# Patient Record
Sex: MALE | HISPANIC OR LATINO | Employment: FULL TIME | URBAN - METROPOLITAN AREA
[De-identification: names, ages, dates, MRNs, and addresses within clinical notes are randomized per-mention and may not be internally consistent; named-entity substitution may affect disease eponyms.]

---

## 2023-03-08 ENCOUNTER — OFFICE VISIT (OUTPATIENT)
Dept: URGENT CARE | Facility: CLINIC | Age: 38
End: 2023-03-08
Payer: COMMERCIAL

## 2023-03-08 VITALS
WEIGHT: 179 LBS | HEIGHT: 64 IN | SYSTOLIC BLOOD PRESSURE: 150 MMHG | HEART RATE: 61 BPM | TEMPERATURE: 98 F | OXYGEN SATURATION: 98 % | BODY MASS INDEX: 30.56 KG/M2 | RESPIRATION RATE: 19 BRPM | DIASTOLIC BLOOD PRESSURE: 96 MMHG

## 2023-03-08 DIAGNOSIS — G56.21 CUBITAL TUNNEL SYNDROME ON RIGHT: ICD-10-CM

## 2023-03-08 DIAGNOSIS — M25.511 ACUTE PAIN OF RIGHT SHOULDER: Primary | ICD-10-CM

## 2023-03-08 DIAGNOSIS — S46.911A RIGHT SHOULDER STRAIN, INITIAL ENCOUNTER: ICD-10-CM

## 2023-03-08 PROCEDURE — 99204 PR OFFICE/OUTPT VISIT, NEW, LEVL IV, 45-59 MIN: ICD-10-PCS | Mod: 25,S$GLB,, | Performed by: STUDENT IN AN ORGANIZED HEALTH CARE EDUCATION/TRAINING PROGRAM

## 2023-03-08 PROCEDURE — 96372 PR INJECTION,THERAP/PROPH/DIAG2ST, IM OR SUBCUT: ICD-10-PCS | Mod: S$GLB,,, | Performed by: EMERGENCY MEDICINE

## 2023-03-08 PROCEDURE — 96372 THER/PROPH/DIAG INJ SC/IM: CPT | Mod: S$GLB,,, | Performed by: EMERGENCY MEDICINE

## 2023-03-08 PROCEDURE — 99204 OFFICE O/P NEW MOD 45 MIN: CPT | Mod: 25,S$GLB,, | Performed by: STUDENT IN AN ORGANIZED HEALTH CARE EDUCATION/TRAINING PROGRAM

## 2023-03-08 RX ORDER — CYCLOBENZAPRINE HCL 5 MG
5 TABLET ORAL 3 TIMES DAILY PRN
Qty: 15 TABLET | Refills: 0 | Status: SHIPPED | OUTPATIENT
Start: 2023-03-08 | End: 2023-03-13

## 2023-03-08 RX ORDER — DEXAMETHASONE SODIUM PHOSPHATE 4 MG/ML
8 INJECTION, SOLUTION INTRA-ARTICULAR; INTRALESIONAL; INTRAMUSCULAR; INTRAVENOUS; SOFT TISSUE
Status: COMPLETED | OUTPATIENT
Start: 2023-03-08 | End: 2023-03-08

## 2023-03-08 RX ORDER — NAPROXEN 500 MG/1
500 TABLET ORAL 2 TIMES DAILY
Qty: 30 TABLET | Refills: 0 | Status: SHIPPED | OUTPATIENT
Start: 2023-03-09 | End: 2023-03-24

## 2023-03-08 RX ORDER — KETOROLAC TROMETHAMINE 30 MG/ML
60 INJECTION, SOLUTION INTRAMUSCULAR; INTRAVENOUS
Status: COMPLETED | OUTPATIENT
Start: 2023-03-08 | End: 2023-03-08

## 2023-03-08 RX ORDER — METHYLPREDNISOLONE 4 MG/1
TABLET ORAL
Qty: 21 EACH | Refills: 0 | Status: SHIPPED | OUTPATIENT
Start: 2023-03-09 | End: 2023-03-29

## 2023-03-08 RX ADMIN — DEXAMETHASONE SODIUM PHOSPHATE 8 MG: 4 INJECTION, SOLUTION INTRA-ARTICULAR; INTRALESIONAL; INTRAMUSCULAR; INTRAVENOUS; SOFT TISSUE at 09:03

## 2023-03-08 RX ADMIN — KETOROLAC TROMETHAMINE 60 MG: 30 INJECTION, SOLUTION INTRAMUSCULAR; INTRAVENOUS at 09:03

## 2023-03-08 NOTE — PROGRESS NOTES
"Subjective:       Patient ID: Rogelio Watts is a 37 y.o. male.    Vitals:  height is 5' 4" (1.626 m) and weight is 81.2 kg (179 lb). His oral temperature is 98.4 °F (36.9 °C). His blood pressure is 150/96 (abnormal) and his pulse is 61. His respiration is 19 and oxygen saturation is 98%.     Chief Complaint: Shoulder Pain    Patient is a 37-year-old male brought to clinic via employer for evaluation of right shoulder pain.  Patient reports no injury or trauma.  Patient reports repetitive movements and work.  Patient reports increased pain in the right shoulder.  Patient states this is causing decreased range of motion of the shoulder.  Patient states pain at current is a 10 on 10 scale in as worst a 10 on 10 scale.  Patient states no over-the-counter medications for symptoms at this point.  Patient states he has not noticed any swelling or skin discoloration to the shoulder.  Patient states pain radiates down into the elbow and sometimes down into his 3rd through 5th fingers.  Patient states that he also intermittently experiences pain whenever he tries to lift something with the right hand which pain then radiates back up into the shoulder.  Patient describes pain to be sharp and stabbing in nature at that time.  Patient states otherwise pain is a constant dull or aching sensation.  Employer states patient has experienced similar episodes approximally 2 years ago where he had to be placed on light duty and provided with anti-inflammatories.  Patient unsure if he had imaging at that visit.    Shoulder Pain   The pain is present in the right shoulder. This is a recurrent problem. The current episode started yesterday. There has been no history of extremity trauma. The problem occurs constantly. The problem has been gradually worsening. The pain is at a severity of 10/10. The pain is severe. Associated symptoms include a limited range of motion and stiffness. Pertinent negatives include no fever, headaches or numbness. "     Constitution: Negative. Negative for chills, sweating, fatigue and fever.   HENT: Negative.     Neck: neck negative. Negative for neck pain, neck stiffness and neck swelling.   Cardiovascular: Negative.  Negative for chest pain and palpitations.   Eyes: Negative.    Respiratory: Negative.  Negative for chest tightness, cough and shortness of breath.    Gastrointestinal: Negative.  Negative for abdominal pain, nausea, vomiting and diarrhea.   Endocrine: negative.   Genitourinary: Negative.    Musculoskeletal:  Positive for joint pain (Right shoulder) and abnormal ROM of joint. Negative for joint swelling.   Skin: Negative.  Negative for color change, pale, rash and erythema.   Allergic/Immunologic: Negative.    Neurological: Negative.  Negative for dizziness, headaches, disorientation, altered mental status, numbness and tingling.   Hematologic/Lymphatic: Negative.    Psychiatric/Behavioral: Negative.  Negative for altered mental status, disorientation and confusion.      Objective:      Physical Exam   Constitutional: He is oriented to person, place, and time. He appears well-developed. He is cooperative.  Non-toxic appearance. He does not appear ill. No distress.   HENT:   Head: Normocephalic and atraumatic.   Ears:   Right Ear: Hearing, tympanic membrane, external ear and ear canal normal.   Left Ear: Hearing, tympanic membrane, external ear and ear canal normal.   Nose: Nose normal. No mucosal edema or nasal deformity. No epistaxis. Right sinus exhibits no maxillary sinus tenderness and no frontal sinus tenderness. Left sinus exhibits no maxillary sinus tenderness and no frontal sinus tenderness.   Mouth/Throat: Uvula is midline, oropharynx is clear and moist and mucous membranes are normal. No trismus in the jaw. Normal dentition. No uvula swelling.   Eyes: Conjunctivae and lids are normal. Pupils are equal, round, and reactive to light. Right eye exhibits no discharge. Left eye exhibits no discharge. No  scleral icterus.   Neck: Trachea normal and phonation normal. Neck supple. No neck rigidity present.   Cardiovascular: Normal rate, regular rhythm, normal heart sounds and normal pulses.   Pulmonary/Chest: Effort normal and breath sounds normal. No respiratory distress. He has no wheezes. He has no rhonchi. He has no rales.   Abdominal: Normal appearance and bowel sounds are normal. He exhibits no distension. Soft. There is no abdominal tenderness.   Musculoskeletal:         General: No signs of injury.      Right shoulder: He exhibits decreased range of motion, tenderness and bony tenderness. He exhibits no swelling and no crepitus.      Right elbow: He exhibits decreased range of motion. He exhibits no swelling. Tenderness (Increased tenderness at cubital tunnel) found.      Cervical back: He exhibits no tenderness.   Neurological: He is alert and oriented to person, place, and time. He exhibits normal muscle tone.   Skin: Skin is warm, dry, intact, not diaphoretic, not pale and no rash. No bruising and No erythema   Psychiatric: His speech is normal and behavior is normal. Judgment and thought content normal.   Nursing note and vitals reviewed.      Assessment:       1. Acute pain of right shoulder    2. Right shoulder strain, initial encounter    3. Cubital tunnel syndrome on right          Plan:         Acute pain of right shoulder  -     XR SHOULDER TRAUMA 3 VIEW RIGHT; Future; Expected date: 03/08/2023    Right shoulder strain, initial encounter    Cubital tunnel syndrome on right    Other orders  -     ketorolac injection 60 mg  -     dexAMETHasone injection 8 mg  -     methylPREDNISolone (MEDROL DOSEPACK) 4 mg tablet; use as directed  Dispense: 21 each; Refill: 0  -     naproxen (NAPROSYN) 500 MG tablet; Take 1 tablet (500 mg total) by mouth 2 (two) times daily. for 15 days  Dispense: 30 tablet; Refill: 0  -     cyclobenzaprine (FLEXERIL) 5 MG tablet; Take 1 tablet (5 mg total) by mouth 3 (three) times  daily as needed for Muscle spasms.  Dispense: 15 tablet; Refill: 0                 X-ray right shoulder: No fracture or dislocation.  No significant degenerative change.  Mild soft tissue swelling is present over the shoulder.  Toradol 60 milligrams IM and Decadron 8 milligrams IM in clinic.  Patient tolerated well.  No complications noted.  Take medications as prescribed.  Use of no other NSAIDs while on naproxen; may rotate with Tylenol.    Instructions provided on muscle relaxers to include no driving or operating heavy machinery.    Patient placed in right shoulder sling in clinic.  Patient tolerated well.  No complications noted.  Positive pulse motor and sensory noted pre and post placement.  Recommend rotating ice and warm moist heat as directed.    Recommend light duty for 1 week then follow-up for further evaluation and clearance.    Follow-up with Orthopedics without resolution of symptoms.    Return to clinic as needed.    Follow-up PCP as needed.    To ED for any new acutely worsening symptoms.    Patient in agreement with plan of care.    DISCLAIMER: Please note that my documentation in this Electronic Healthcare Record was produced using speech recognition software and therefore may contain errors related to that software system.These could include grammar, punctuation and spelling errors or the inclusion/exclusion of phrases that were not intended. Garbled syntax, mangled pronouns, and other bizarre constructions may be attributed to that software system.

## 2023-03-20 ENCOUNTER — OFFICE VISIT (OUTPATIENT)
Dept: URGENT CARE | Facility: CLINIC | Age: 38
End: 2023-03-20
Payer: COMMERCIAL

## 2023-03-20 VITALS
HEART RATE: 75 BPM | SYSTOLIC BLOOD PRESSURE: 143 MMHG | TEMPERATURE: 98 F | RESPIRATION RATE: 16 BRPM | BODY MASS INDEX: 30.73 KG/M2 | DIASTOLIC BLOOD PRESSURE: 90 MMHG | OXYGEN SATURATION: 97 % | WEIGHT: 179 LBS

## 2023-03-20 DIAGNOSIS — S49.91XD RIGHT SHOULDER INJURY, SUBSEQUENT ENCOUNTER: ICD-10-CM

## 2023-03-20 DIAGNOSIS — Z76.89 RETURN TO WORK EVALUATION: Primary | ICD-10-CM

## 2023-03-20 PROCEDURE — 99213 OFFICE O/P EST LOW 20 MIN: CPT | Mod: S$GLB,,, | Performed by: STUDENT IN AN ORGANIZED HEALTH CARE EDUCATION/TRAINING PROGRAM

## 2023-03-20 PROCEDURE — 99213 PR OFFICE/OUTPT VISIT, EST, LEVL III, 20-29 MIN: ICD-10-PCS | Mod: S$GLB,,, | Performed by: STUDENT IN AN ORGANIZED HEALTH CARE EDUCATION/TRAINING PROGRAM

## 2023-03-20 NOTE — PROGRESS NOTES
Subjective:       Patient ID: Rogelio Watts is a 37 y.o. male.    Vitals:  weight is 81.2 kg (179 lb). His temperature is 98.2 °F (36.8 °C). His blood pressure is 143/90 (abnormal) and his pulse is 75. His respiration is 16 and oxygen saturation is 97%.     Chief Complaint: Shoulder Injury    Patient is a 37-year-old male who presents to clinic for work clearance evaluation.  Patient previously had acute shoulder pain while at work on March 8, 2023.  Patient was evaluated in the clinic.  Patient had negative x-rays in clinic.  Patient was provided with Decadron and Toradol injections.  Patient was prescribed Medrol Dosepak, Flexeril, naproxen.  Patient was accompanied with light duty via his employer.  Patient initially experience right shoulder pain with decreased range of motion.  Patient's pain initially radiated down into his elbow and fingers.  Patient states that all his symptoms have currently resolved and he is feeling much better at this point.  Employer present with patient at visit states they gave him an extra week of light duty which seems to have helped well with his symptoms.    Shoulder Injury   The incident occurred at work. The incident occurred more than 1 week ago. Pertinent negatives include no chest pain or numbness.     Constitution: Negative. Negative for chills, sweating, fatigue and fever.   HENT: Negative.     Neck: neck negative. Negative for neck pain, neck stiffness and neck swelling.   Cardiovascular: Negative.  Negative for chest pain and palpitations.   Eyes: Negative.    Respiratory: Negative.  Negative for chest tightness, cough and shortness of breath.    Gastrointestinal: Negative.  Negative for abdominal pain, nausea, vomiting and diarrhea.   Endocrine: negative.   Genitourinary: Negative.    Musculoskeletal:  Negative for joint pain, joint swelling and abnormal ROM of joint.   Skin: Negative.  Negative for color change, pale, rash, wound and erythema.   Allergic/Immunologic:  Negative.    Neurological: Negative.  Negative for dizziness, headaches, disorientation, altered mental status, numbness and tingling.   Hematologic/Lymphatic: Negative.    Psychiatric/Behavioral: Negative.  Negative for altered mental status, disorientation and confusion.      Objective:      Physical Exam   Constitutional: He is oriented to person, place, and time. He appears well-developed. He is cooperative.  Non-toxic appearance. He does not appear ill. No distress.   HENT:   Head: Normocephalic and atraumatic.   Ears:   Right Ear: Hearing, tympanic membrane, external ear and ear canal normal.   Left Ear: Hearing, tympanic membrane, external ear and ear canal normal.   Nose: Nose normal. No mucosal edema or nasal deformity. No epistaxis. Right sinus exhibits no maxillary sinus tenderness and no frontal sinus tenderness. Left sinus exhibits no maxillary sinus tenderness and no frontal sinus tenderness.   Mouth/Throat: Uvula is midline, oropharynx is clear and moist and mucous membranes are normal. No trismus in the jaw. Normal dentition. No uvula swelling.   Eyes: Conjunctivae and lids are normal. Pupils are equal, round, and reactive to light. Right eye exhibits no discharge. Left eye exhibits no discharge. No scleral icterus.   Neck: Trachea normal and phonation normal. Neck supple. No neck rigidity present.   Cardiovascular: Normal rate, regular rhythm, normal heart sounds and normal pulses.   Pulmonary/Chest: Effort normal and breath sounds normal. No respiratory distress. He has no wheezes. He has no rhonchi. He has no rales.   Abdominal: Normal appearance and bowel sounds are normal. He exhibits no distension. Soft. There is no abdominal tenderness.   Musculoskeletal: Normal range of motion.         General: No swelling or tenderness. Normal range of motion.      Right shoulder: He exhibits normal range of motion, no tenderness, no bony tenderness, no swelling, no crepitus, no deformity and normal  strength.      Right elbow: He exhibits normal range of motion and no swelling. No tenderness found.      Right wrist: He exhibits normal range of motion, no tenderness, no bony tenderness, no swelling and no crepitus.      Cervical back: He exhibits no tenderness.      Right upper arm: He exhibits no tenderness, no bony tenderness, no swelling and no deformity.      Right forearm: He exhibits no tenderness, no bony tenderness, no swelling and no deformity.   Neurological: He is alert and oriented to person, place, and time. He exhibits normal muscle tone.   Skin: Skin is warm, dry, intact, not diaphoretic, not pale and no rash. No erythema   Psychiatric: His speech is normal and behavior is normal. Judgment and thought content normal.   Nursing note and vitals reviewed.      Assessment:       1. Return to work evaluation    2. Right shoulder injury, subsequent encounter          Plan:         Return to work evaluation    Right shoulder injury, subsequent encounter                 Patient reports feeling much better now.  Patient reports ready to return to work restrictions.  Patient reports improvement symptoms with previously prescribed treatment regimen.    Patient with full range of motion and no obvious skin discoloration, wound, tenderness, or swelling.  Positive pulse motor and sensory noted to the right upper extremity.  Equal  strength noted.  Agree patient is clear for full duty without restriction.  Symptomatic treatment as needed.    Return to clinic as needed.    To Orthopedics with any further complications.    Follow-up with PCP as needed.    To ED for any new acutely worsening symptoms.    Patient in agreement plan of care.    DISCLAIMER: Please note that my documentation in this Electronic Healthcare Record was produced using speech recognition software and therefore may contain errors related to that software system.These could include grammar, punctuation and spelling errors or the  inclusion/exclusion of phrases that were not intended. Garbled syntax, mangled pronouns, and other bizarre constructions may be attributed to that software system.